# Patient Record
Sex: FEMALE | Race: ASIAN | URBAN - METROPOLITAN AREA
[De-identification: names, ages, dates, MRNs, and addresses within clinical notes are randomized per-mention and may not be internally consistent; named-entity substitution may affect disease eponyms.]

---

## 2017-03-17 ENCOUNTER — OFFICE VISIT (OUTPATIENT)
Dept: FAMILY MEDICINE CLINIC | Facility: CLINIC | Age: 32
End: 2017-03-17

## 2017-03-17 DIAGNOSIS — Z23 NEED FOR VACCINATION: Primary | ICD-10-CM

## 2017-03-17 NOTE — PROGRESS NOTES
Patient comes to clinic stating she need 3 Td/Tdap vaccinations and a MMR for school. However, patient reports she had a Tdap and MMR in 2014.  I informed patient she should be immune, but if she needs evidence of her immunization status it would be best to

## 2017-03-31 ENCOUNTER — OFFICE VISIT (OUTPATIENT)
Dept: FAMILY MEDICINE CLINIC | Facility: CLINIC | Age: 32
End: 2017-03-31

## 2017-03-31 VITALS
DIASTOLIC BLOOD PRESSURE: 85 MMHG | SYSTOLIC BLOOD PRESSURE: 136 MMHG | HEART RATE: 84 BPM | HEIGHT: 66.93 IN | BODY MASS INDEX: 18.78 KG/M2 | WEIGHT: 119.63 LBS

## 2017-03-31 DIAGNOSIS — Z01.84 IMMUNITY STATUS TESTING: Primary | ICD-10-CM

## 2017-03-31 LAB — RUBV IGG SER-ACNC: 13.9 IU/ML

## 2017-03-31 PROCEDURE — 99202 OFFICE O/P NEW SF 15 MIN: CPT | Performed by: FAMILY MEDICINE

## 2017-03-31 PROCEDURE — 99212 OFFICE O/P EST SF 10 MIN: CPT | Performed by: FAMILY MEDICINE

## 2017-03-31 NOTE — PROGRESS NOTES
HPI:    Trevon Lomeli is a 27year old female presents to clinic needing immunity status testing. Patient was born in [place], states that she had all of her childhood vaccines.  Recently in 2014, has records of having  MMR, Varicella, and Tdap vaccines Vitals reviewed.       ASSESSMENT/PLAN:   Immunity status testing  (primary encounter diagnosis)  - MMR titers to lab  - patient informed that there are no blood titers for Tdap   - letter written explaining that CDC recommends this once every 10 years  -

## 2017-04-11 ENCOUNTER — TELEPHONE (OUTPATIENT)
Dept: FAMILY MEDICINE CLINIC | Facility: CLINIC | Age: 32
End: 2017-04-11

## 2017-04-11 NOTE — TELEPHONE ENCOUNTER
LMTCB  CSS when patient calls back, have her make a nurse visit at [facility] for an immunization  See 3/31/17 results, Dr Paty Talamantes ordered MMR

## 2017-04-13 ENCOUNTER — NURSE ONLY (OUTPATIENT)
Dept: FAMILY MEDICINE CLINIC | Facility: CLINIC | Age: 32
End: 2017-04-13

## 2017-04-13 DIAGNOSIS — Z23 NEED FOR VACCINATION: Primary | ICD-10-CM

## 2017-04-13 PROCEDURE — 90707 MMR VACCINE SC: CPT | Performed by: FAMILY MEDICINE

## 2017-04-13 PROCEDURE — 90471 IMMUNIZATION ADMIN: CPT | Performed by: FAMILY MEDICINE

## (undated) NOTE — Clinical Note
3/31/2017          To Whom It May Concern: Dwight Marc is currently under my medical care and was seen in clinic today. Patient has records reflecting that her last Tdap was on 10/13/2014.  Per CDC recommendations, she is required to have one every ten y

## (undated) NOTE — MR AVS SNAPSHOT
[facility]  [address] [facility]  [phone]               Thank you for choosing us for your health care visit with SAÚL Ross.   We are glad to serve you and happy to provide you with this summary of yo Tips for making healthy food choices  -   Enjoy your food, but eat less. Fully enjoy your food when eating. Don’t eat while distracted and slow down. Avoid over sized portions. Don’t eat while when you’re bored.      EAT THESE FOODS MORE OFTEN: EAT T

## (undated) NOTE — MR AVS SNAPSHOT
[facility]  [address], [address] [place] [address]  [phone]               Thank you for choosing us for your health care visit with Jayne Madsen MD.  We are glad to serve you and happy to provide you with this summary o not sign up before the expiration date, you must request a new code. Your unique [employer] Access Code: [email or web address]  Expires: 5/16/2017  5:18 PM    If you have questions, you can call [phone] to talk to our [facility] Staff.  Remember, [employer]

## (undated) NOTE — MR AVS SNAPSHOT
[facility]  [address], [address] [place] [address]  [phone]               Thank you for choosing us for your health care visit with Nurse.   We are glad to serve you and happy to provide you with this summary of your visit